# Patient Record
Sex: MALE
[De-identification: names, ages, dates, MRNs, and addresses within clinical notes are randomized per-mention and may not be internally consistent; named-entity substitution may affect disease eponyms.]

---

## 2019-04-11 ENCOUNTER — HOSPITAL ENCOUNTER (OUTPATIENT)
Dept: HOSPITAL 56 - MW.SDS | Age: 49
Discharge: HOME | End: 2019-04-11
Attending: SURGERY
Payer: COMMERCIAL

## 2019-04-11 DIAGNOSIS — K29.71: Primary | ICD-10-CM

## 2019-04-11 DIAGNOSIS — K64.8: ICD-10-CM

## 2019-04-11 DIAGNOSIS — Z79.899: ICD-10-CM

## 2019-04-11 DIAGNOSIS — K58.0: ICD-10-CM

## 2019-04-11 DIAGNOSIS — K20.9: ICD-10-CM

## 2019-04-11 NOTE — PCM.OPNOTE
- General Post-Op/Procedure Note


Date of Surgery/Procedure: 04/11/19


Operative Procedure(s): colonoscopy w bx


Findings: 





see dict [id number]


Pre Op Diagnosis: BRBPR and possible crohn


Post-Op Diagnosis: Same


Anesthesia Technique: Moderate Sedation


Primary Surgeon: Angelo Hernández


Pathology: 





1) egd bx


2) random colon bx


Complications: None


Condition: Good

## 2019-04-11 NOTE — PCM.PREANE
Preanesthetic Assessment





- Anesthesia/Transfusion/Family Hx


Anesthesia History: Prior Anesthesia Without Reaction


Family History of Anesthesia Reaction: No


Transfusion History: No Prior Transfusion(s)





- Review of Systems


General: No Symptoms


Pulmonary: No Symptoms


Cardiovascular: No Symptoms


Gastrointestinal: No Symptoms


Neurological: No Symptoms


Other: Reports: None





- Physical Assessment


NPO Status Date: 04/10/19


Height: 6 ft 3 in


Weight: 124.284 kg


ASA Class: 2


Mental Status: Alert & Oriented x3


Airway Class: Mallampati = 2


Dentition: Reports: Normal Dentition


ROM/Head Extension: Full


Lungs: Clear to Auscultation, Normal Respiratory Effort


Cardiovascular: Regular Rate, Regular Rhythm





- Allergies


Allergies/Adverse Reactions: 


 Allergies











Allergy/AdvReac Type Severity Reaction Status Date / Time


 


No Known Allergies Allergy   Verified 04/08/19 07:53














- Blood


Blood Available: No





- Anesthesia Plan


Pre-Op Medication Ordered: None





- Acknowledgements


Anesthesia Type Planned: General Anesthesia, MAC


Pt an Appropriate Candidate for the Planned Anesthesia: Yes


Alternatives and Risks of Anesthesia Discussed w Pt/Guardian: Yes


Pt/Guardian Understands and Agrees with Anesthesia Plan: Yes


Additional Comments: 





PMH: IBS, htn by hx, on no meds


PLAN: mac/tiva





PreAnesthesia Questionnaire


HEENT History: Reports: Other (See Below)


Other HEENT History: wears contacts


Cardiovascular History: Reports: None


Respiratory History: Reports: None


Gastrointestinal History: Reports: Chronic Diarrhea, Other (See Below)


Other Gastrointestinal History: possible IBS


Genitourinary History: Reports: None


Musculoskeletal History: Reports: None


Neurological History: Reports: None


Psychiatric History: Reports: None


Endocrine/Metabolic History: Reports: Obesity/BMI 30+


Hematologic History: Reports: None


Immunologic History: Reports: None


Oncologic (Cancer) History: Reports: None


Dermatologic History: Reports: Other (See Below)


Other Dermatologic History: chronic dermatitis





- Past Surgical History


Head Surgeries/Procedures: Reports: None


HEENT Surgical History: Reports: None


Cardiovascular Surgical History: Reports: None


Respiratory Surgical History: Reports: None


GI Surgical History: Reports: None


Female  Surgical History: Reports: None


Male  Surgical History: Reports: None


Endocrine Surgical History: Reports: None


Neurological Surgical History: Reports: None


Musculoskeletal Surgical History: Reports: None


Oncologic Surgical History: Reports: None


Dermatological Surgical History: Reports: Skin Biopsy





- SUBSTANCE USE


Smoking Status *Q: Former Smoker


Tobacco Use Within Last Twelve Months: No


Recreational Drug Use History: No





- HOME MEDS


Home Medications: 


 Home Meds





Amitriptyline [Elavil] 25 mg PO BEDTIME 04/08/19 [History]











- CURRENT (IN HOUSE) MEDS


Current Meds: 





 Current Medications





Lactated Ringer's (Ringers, Lactated)  1,000 mls @ 125 mls/hr IV ASDIRECTED SHARMILA





Discontinued Medications





Lidocaine HCl (Xylocaine-Mpf 1%) Confirm Administered Dose 5 mls @ as directed 

.ROUTE .STK-MED ONE


   Stop: 04/11/19 10:38


Propofol (Diprivan  20 Ml) Confirm Administered Dose 400 mg .ROUTE .STK-MED ONE


   Stop: 04/11/19 10:38

## 2019-04-11 NOTE — OR
SURGEON:

Angelo Hernández MD

 

DATE OF PROCEDURE:  04/11/2019

 

PREOPERATIVE DIAGNOSES:

1. Bright red blood per rectum.

2. Possible colon.

 

POST PROCEDURE DIAGNOSES:

1. Bright red blood per rectum.

2. Possible colon.

 

PROCEDURE PERFORMED:

1. EGD with biopsy.

2. Colonoscopy with biopsy.

 

DESCRIPTION OF PROCEDURE:

EGD: The patient was taken to the endoscopy room, and with the CRNA, Diprivan

was administered.

 

A well-lubricated EGD scope was gently inserted through the oropharynx, down the

esophagus, passing through the gastroesophageal junction, into the stomach.  The

mucosa was examined upon the passage.  Any etiology will be noted.  Once in the

stomach, we continued to advance to the distal antrum, passed through the

pylorus into the second portion of the duodenum.  Again, the mucosa was examined

for any abnormality and etiology.

 

The scope was then retrieved back to the stomach and then retroflexed to look at

the fundus of the stomach.  If a biopsy was indicated, we will biopsy the

antrum, body, and gastroesophageal junction.  The air will be sucked out while

the scope is retrieved to reduce the patient's discomfort.

 

The patient tolerated the procedure well.  There were no intraoperative

complications.  Dr. Hernández was present through the whole procedure.

 

Prior to surgery, a time-out had been called, the patient identified, procedure

identified and antibiotic administered.

 

Colonoscopy:  The patient was taken to the endoscopy room.  A time out was

called, patient identified, and procedure identified.  Diprivan was then

administrated.  Patient went from awake to sleep, hearing doctor talking or door

closing is normal.  Perineum inspection and digital examination were then

performed.  A well-lubricated colonoscope was gently inserted through the

rectum, advanced past the rectosigmoid junction, the descending colon, splenic

flexure, transverse colon, hepatic flexure, ascending colon, arrived to the

cecum.  Cecum was identified as dictated in the finding.  Then the scope was

carefully withdrawn while attention was paid to the mucosal surface for any

abnormality.  Air will be sucked out during the scope withdrawal.  At the

rectum, retroflexed to examine any rectal diseases, fistula or hemorrhoids.

During mucosal examination, abnormality or polyp was noted; picture taken and

biopsy performed.  Patient tolerated procedure well.  There were no

intraoperative complications, and Dr. Hernández was present throughout the whole

procedure.

 

EGD FINDINGS:

1. The patient is easily sedated with CRNA and Diprivan.  The patient is

    soundly snoring.

2. Oropharynx and proximal esophagus are free of disease, stricture,

    ulceration, AV malformation, inflammation, or varicosity, none of those.

    Distal esophagus at GE junction at 40 shows pretty prominent salmon-colored

    change consistent with moderate acid reflux.  Stomach rugae is normal in

    appearance.  There is no blood, bile, ulcer, or food particle observed.

    Antrum is absolutely inflamed in couple areas with some yellow dot

    consistent with possible healing ulcer similar to aphthous ulcer.  Duodenal

    bulb and duodenum were grossly normal in appearance.  Retroflexed look at

    the fundus of stomach, there is no hiatal hernia.  Biopsy done at antrum,

    inflammation, body, GE junction at 40, and sucked out the gas while scope

    pulling out.

 

COLONOSCOPY FINDINGS:

1. The patient is easily sedated with CRNA and Diprivan.  The patient is

    soundly snoring.

2. Bowel prep is average with some liquid stool.  No semi-formed stool.  No

    stool ball.  Colon is rather redundant at the sigmoid and the patient has

    to be laid on his back in order to get to the cecum.  Cecum indicated by

    ileocecal fold, one-to-one indentation, and retroflex.  Appendix orifice

    and light immittance are not observed with some irrigation.  Mucosa

    examined upon scope pulling out.  The patient does not have polyp, mass,

    growth, inflammation, AV malformation, ulceration, bleeding, or

    diverticulosis, none of those.  The patient has mild internal hemorrhoids.

    Random biopsy was done because of primary care provider concerned about

    Crohn disease.  The patient would benefit from repeat colonoscopy in 10

    years from today or if the biopsy turned out pathology or if other

    clinically indicated scope earlier.  The patient probably will need to be

    on PPI for his stomach situation.

 

 

CHELSEY / EDGAR

DD:  04/11/2019 11:54:24

DT:  04/11/2019 17:47:15

Job #:  [id number]